# Patient Record
Sex: MALE | NOT HISPANIC OR LATINO | URBAN - METROPOLITAN AREA
[De-identification: names, ages, dates, MRNs, and addresses within clinical notes are randomized per-mention and may not be internally consistent; named-entity substitution may affect disease eponyms.]

---

## 2018-05-08 ENCOUNTER — IMPORTED ENCOUNTER (OUTPATIENT)
Dept: URBAN - METROPOLITAN AREA CLINIC 31 | Facility: CLINIC | Age: 43
End: 2018-05-08

## 2018-05-08 PROBLEM — H25.043: Noted: 2018-05-08

## 2018-05-08 PROCEDURE — 92004 COMPRE OPH EXAM NEW PT 1/>: CPT

## 2018-05-08 PROCEDURE — 92025 CPTRIZED CORNEAL TOPOGRAPHY: CPT

## 2018-05-08 NOTE — PATIENT DISCUSSION
1.  Cataract OU: Explained how cataracts can effect vision. Recommend clinical observation. The patient was advised to contact us if any change or worsening of vision. 2. Very interested in LASIK. Explained about cataract but still interested in consult. 3. Return for an appointment in 1 year for comprehensive exam. with Dr. Alton Murphy.

## 2018-05-15 ENCOUNTER — IMPORTED ENCOUNTER (OUTPATIENT)
Dept: URBAN - METROPOLITAN AREA CLINIC 31 | Facility: CLINIC | Age: 43
End: 2018-05-15

## 2018-05-15 PROBLEM — H52.209: Noted: 2018-05-15

## 2018-05-15 PROBLEM — H25.811: Noted: 2018-05-15

## 2018-05-15 PROBLEM — H25.813: Noted: 2018-05-15

## 2018-05-15 PROCEDURE — 76514 ECHO EXAM OF EYE THICKNESS: CPT

## 2018-05-15 PROCEDURE — 92025 CPTRIZED CORNEAL TOPOGRAPHY: CPT

## 2018-05-15 NOTE — PATIENT DISCUSSION
1.  We discussed risks vs. benefits of surgery. Not a great candidate for laser refractive surgery because of cataract and corneal thickness. 2nd option would be RLE OD Trial CL fit for monovision OD distance OS uncorrected for reading. I have discussed the possibility for the patient to require glasses and/or contact lenses postoperatively for the clearest vision possible. I have answered all of the patients questions. If pt likes vision would need to see refractive lens exchange video will need toric IOL OD with ORA no femto2. Combined Types of Cataract OU: Explained how cataracts can effect vision. Recommend clinical observation. No significant change from last DF The patient was advised to contact us if any change or worsening of vision. Return for an appointment in 1 week for contact lens check. with Dr. Flavio Frankel.

## 2018-05-15 NOTE — PATIENT DISCUSSION
Combined Types of Cataract OU: Explained how cataracts can effect vision. Recommend clinical observation. No significant change from last DF The patient was advised to contact us if any change or worsening of vision.

## 2018-05-23 ENCOUNTER — IMPORTED ENCOUNTER (OUTPATIENT)
Dept: URBAN - METROPOLITAN AREA CLINIC 31 | Facility: CLINIC | Age: 43
End: 2018-05-23

## 2018-05-23 NOTE — PATIENT DISCUSSION
1.  We discussed risks vs. benefits of surgery. Not a great candidate for laser refractive surgery because of cataract and corneal thickness. 2nd option would be RLE OD Pt did well with Trial CL for monovision OD distance OS uncorrected for reading. I also reviewed option of RLE with use of MFIOL OD. I have answered all of the patients questions. If pt likes vision would need to see refractive lens exchange video will need toric IOL OD with ORA no femto Distance vs MFIOL pt will let us know after watching informed consent video wants wife to watch with him. He will come back to review2. Combined Types of Cataract OU: Explained how cataracts can effect vision. Recommend clinical observation. No significant change from last DF The patient was advised to contact us if any change or worsening of vision. Return for an appointment for [address] [place] [address] with Dr. Gretchen Bradshaw.

## 2018-05-24 PROBLEM — H52.209: Noted: 2018-05-24

## 2018-05-24 PROBLEM — H25.813: Noted: 2018-05-24

## 2022-04-02 ASSESSMENT — VISUAL ACUITY
OS_SC: J2
OS_SC: 20/20
OS_SC: 20/20+1
OD_SC: 20/30-2
OS_CC: J1+
OD_SC: 20/20
OU_SC: 20/25-2
OS_CC: 20/40-2
OD_SC: J1-3
OS_CC: 20/25-3
OS_CC: 20/70-1
OD_SC: 20/30-2
OD_CC: 20/80-1
OU_CC: 20/20
OD_CC: 20/40-2
OD_CC: J1+
OD_SC: 20/25

## 2022-04-02 ASSESSMENT — TONOMETRY
OD_IOP_MMHG: 17
OS_IOP_MMHG: 17
OS_IOP_MMHG: 14
OD_IOP_MMHG: 15

## 2022-07-09 ENCOUNTER — TELEPHONE ENCOUNTER (OUTPATIENT)
Dept: URBAN - METROPOLITAN AREA CLINIC 121 | Facility: CLINIC | Age: 47
End: 2022-07-09

## 2022-07-09 RX ORDER — ROSUVASTATIN CALCIUM 5 MG
TABLET ORAL
Refills: 0 | OUTPATIENT
Start: 2010-09-14 | End: 2012-06-27

## 2022-07-09 RX ORDER — DOCUSATE SODIUM 100 MG/1
CAPSULE ORAL TWICE A DAY
Refills: 1 | OUTPATIENT
Start: 2012-12-05 | End: 2013-02-15

## 2022-07-09 RX ORDER — DICYCLOMINE HYDROCHLORIDE 10 MG/1
EVERY 8 HOURS AS NEEDED FOR ABDOMINAL PAIN CAPSULE ORAL EVERY 8 HOURS
Refills: 0 | OUTPATIENT
Start: 2017-12-21 | End: 2018-01-08

## 2022-07-09 RX ORDER — HYDROCORTISONE ACETATE 25 MG
SUPPOSITORY, RECTAL RECTAL ONCE A DAY
Refills: 3 | OUTPATIENT
Start: 2013-02-15 | End: 2018-01-31

## 2022-07-09 RX ORDER — HYDROCORTISONE ACETATE 25 MG
TWICE A DAY FOR 10-14 DAYS THEN STOP FOR 2 WEEKS SUPPOSITORY, RECTAL RECTAL ONCE A DAY
Refills: 3 | OUTPATIENT
Start: 2018-02-21 | End: 2018-01-31

## 2022-07-09 RX ORDER — HYDROCORTISONE ACETATE 25 MG/1
INSERT ONE INTO RECTUM QHS PRN AS DIRECTED SUPPOSITORY RECTAL
Refills: 1 | OUTPATIENT
Start: 2012-12-05 | End: 2013-02-15

## 2022-07-10 ENCOUNTER — TELEPHONE ENCOUNTER (OUTPATIENT)
Dept: URBAN - METROPOLITAN AREA CLINIC 121 | Facility: CLINIC | Age: 47
End: 2022-07-10

## 2022-07-10 RX ORDER — DICYCLOMINE HYDROCHLORIDE 10 MG/1
EVERY 8 HOURS AS NEEDED FOR ABDOMINAL PAIN CAPSULE ORAL EVERY 8 HOURS
Refills: 3 | Status: ACTIVE | COMMUNITY
Start: 2018-01-19

## 2022-07-10 RX ORDER — HYDROCORTISONE ACETATE 25 MG
TWICE A DAY FOR 10-14 DAYS THEN STOP FOR 2 WEEKS SUPPOSITORY, RECTAL RECTAL ONCE A DAY
Refills: 3 | Status: ACTIVE | COMMUNITY
Start: 2018-02-22

## 2024-06-13 ENCOUNTER — NEW PATIENT (OUTPATIENT)
Dept: URBAN - METROPOLITAN AREA CLINIC 29 | Facility: CLINIC | Age: 49
End: 2024-06-13

## 2024-06-13 DIAGNOSIS — H52.209: ICD-10-CM

## 2024-06-13 DIAGNOSIS — H25.813: ICD-10-CM

## 2024-06-13 PROCEDURE — 92015 DETERMINE REFRACTIVE STATE: CPT

## 2024-06-13 PROCEDURE — 92004 COMPRE OPH EXAM NEW PT 1/>: CPT

## 2024-06-13 ASSESSMENT — VISUAL ACUITY
OD_SC: 20/30+1
OD_SC: 20/40+2
OS_SC: 20/20-3
OS_SC: 20/40

## 2024-06-13 ASSESSMENT — TONOMETRY
OS_IOP_MMHG: 14
OD_IOP_MMHG: 15

## 2025-08-25 ENCOUNTER — OFFICE VISIT (OUTPATIENT)
Dept: URBAN - METROPOLITAN AREA CLINIC 63 | Facility: CLINIC | Age: 50
End: 2025-08-25

## 2025-08-25 RX ORDER — LEVOFLOXACIN 500 MG/1
TAKE 1 TABLET BY MOUTH EVERY DAY FOR 7 DAYS TABLET, FILM COATED ORAL
Qty: 7 EACH | Refills: 0 | Status: ACTIVE | COMMUNITY

## 2025-08-25 RX ORDER — MELOXICAM 7.5 MG/1
TAKE 1 TABLET BY MOUTH EVERY DAY TABLET ORAL
Qty: 14 EACH | Refills: 0 | Status: ACTIVE | COMMUNITY

## 2025-08-28 ENCOUNTER — DASHBOARD ENCOUNTERS (OUTPATIENT)
Age: 50
End: 2025-08-28